# Patient Record
Sex: FEMALE | Race: BLACK OR AFRICAN AMERICAN | Employment: FULL TIME | URBAN - METROPOLITAN AREA
[De-identification: names, ages, dates, MRNs, and addresses within clinical notes are randomized per-mention and may not be internally consistent; named-entity substitution may affect disease eponyms.]

---

## 2020-11-28 ENCOUNTER — HOSPITAL ENCOUNTER (EMERGENCY)
Age: 20
Discharge: HOME | End: 2020-11-29
Attending: EMERGENCY MEDICINE
Payer: MEDICAID

## 2020-11-28 ENCOUNTER — APPOINTMENT (OUTPATIENT)
Dept: CT IMAGING | Age: 20
End: 2020-11-28
Payer: MEDICAID

## 2020-11-28 VITALS
TEMPERATURE: 97.4 F | RESPIRATION RATE: 18 BRPM | SYSTOLIC BLOOD PRESSURE: 148 MMHG | OXYGEN SATURATION: 98 % | WEIGHT: 250 LBS | BODY MASS INDEX: 37.03 KG/M2 | HEART RATE: 101 BPM | HEIGHT: 69 IN | DIASTOLIC BLOOD PRESSURE: 99 MMHG

## 2020-11-28 LAB
ALBUMIN SERPL-MCNC: 3.8 G/DL (ref 3.5–5.2)
ALP BLD-CCNC: 79 U/L (ref 35–104)
ALT SERPL-CCNC: 9 U/L (ref 0–32)
ANION GAP SERPL CALCULATED.3IONS-SCNC: 9 MMOL/L (ref 7–16)
AST SERPL-CCNC: 12 U/L (ref 0–31)
BACTERIA: ABNORMAL /HPF
BILIRUB SERPL-MCNC: 0.4 MG/DL (ref 0–1.2)
BILIRUBIN URINE: NEGATIVE
BLOOD, URINE: NEGATIVE
BUN BLDV-MCNC: 6 MG/DL (ref 6–20)
CALCIUM SERPL-MCNC: 9.1 MG/DL (ref 8.6–10.2)
CHLORIDE BLD-SCNC: 107 MMOL/L (ref 98–107)
CLARITY: CLEAR
CO2: 27 MMOL/L (ref 22–29)
COLOR: YELLOW
CREAT SERPL-MCNC: 0.7 MG/DL (ref 0.5–1)
GFR AFRICAN AMERICAN: >60
GFR NON-AFRICAN AMERICAN: >60 ML/MIN/1.73
GLUCOSE BLD-MCNC: 93 MG/DL (ref 74–99)
GLUCOSE URINE: NEGATIVE MG/DL
HCG, URINE, POC: NEGATIVE
HCT VFR BLD CALC: 35 % (ref 34–48)
HEMOGLOBIN: 11.3 G/DL (ref 11.5–15.5)
KETONES, URINE: NEGATIVE MG/DL
LACTIC ACID, SEPSIS: 1 MMOL/L (ref 0.5–1.9)
LEUKOCYTE ESTERASE, URINE: NEGATIVE
Lab: NORMAL
MCH RBC QN AUTO: 29.6 PG (ref 26–35)
MCHC RBC AUTO-ENTMCNC: 32.3 % (ref 32–34.5)
MCV RBC AUTO: 91.6 FL (ref 80–99.9)
NEGATIVE QC PASS/FAIL: NORMAL
NITRITE, URINE: NEGATIVE
PDW BLD-RTO: 14.1 FL (ref 11.5–15)
PH UA: 6 (ref 5–9)
PLATELET # BLD: 290 E9/L (ref 130–450)
PMV BLD AUTO: 10.1 FL (ref 7–12)
POSITIVE QC PASS/FAIL: NORMAL
POTASSIUM SERPL-SCNC: 3.9 MMOL/L (ref 3.5–5)
PROTEIN UA: NEGATIVE MG/DL
RBC # BLD: 3.82 E12/L (ref 3.5–5.5)
RBC UA: ABNORMAL /HPF (ref 0–2)
SODIUM BLD-SCNC: 143 MMOL/L (ref 132–146)
SPECIFIC GRAVITY UA: 1.02 (ref 1–1.03)
TOTAL PROTEIN: 7.4 G/DL (ref 6.4–8.3)
UROBILINOGEN, URINE: 0.2 E.U./DL
WBC # BLD: 12.5 E9/L (ref 4.5–11.5)
WBC UA: ABNORMAL /HPF (ref 0–5)

## 2020-11-28 PROCEDURE — 6360000002 HC RX W HCPCS: Performed by: NURSE PRACTITIONER

## 2020-11-28 PROCEDURE — 80053 COMPREHEN METABOLIC PANEL: CPT

## 2020-11-28 PROCEDURE — 2580000003 HC RX 258: Performed by: RADIOLOGY

## 2020-11-28 PROCEDURE — 2580000003 HC RX 258: Performed by: EMERGENCY MEDICINE

## 2020-11-28 PROCEDURE — 96375 TX/PRO/DX INJ NEW DRUG ADDON: CPT

## 2020-11-28 PROCEDURE — 74177 CT ABD & PELVIS W/CONTRAST: CPT

## 2020-11-28 PROCEDURE — 36415 COLL VENOUS BLD VENIPUNCTURE: CPT

## 2020-11-28 PROCEDURE — 83605 ASSAY OF LACTIC ACID: CPT

## 2020-11-28 PROCEDURE — 6360000004 HC RX CONTRAST MEDICATION: Performed by: RADIOLOGY

## 2020-11-28 PROCEDURE — 11770 EXC PILONIDAL CYST SIMPLE: CPT

## 2020-11-28 PROCEDURE — 10080 I&D PILONIDAL CYST SIMPLE: CPT

## 2020-11-28 PROCEDURE — 96374 THER/PROPH/DIAG INJ IV PUSH: CPT

## 2020-11-28 PROCEDURE — 85027 COMPLETE CBC AUTOMATED: CPT

## 2020-11-28 PROCEDURE — 81001 URINALYSIS AUTO W/SCOPE: CPT

## 2020-11-28 PROCEDURE — 99282 EMERGENCY DEPT VISIT SF MDM: CPT

## 2020-11-28 RX ORDER — 0.9 % SODIUM CHLORIDE 0.9 %
1000 INTRAVENOUS SOLUTION INTRAVENOUS ONCE
Status: COMPLETED | OUTPATIENT
Start: 2020-11-28 | End: 2020-11-28

## 2020-11-28 RX ORDER — KETOROLAC TROMETHAMINE 30 MG/ML
15 INJECTION, SOLUTION INTRAMUSCULAR; INTRAVENOUS ONCE
Status: COMPLETED | OUTPATIENT
Start: 2020-11-28 | End: 2020-11-28

## 2020-11-28 RX ORDER — SODIUM CHLORIDE 0.9 % (FLUSH) 0.9 %
10 SYRINGE (ML) INJECTION PRN
Status: DISCONTINUED | OUTPATIENT
Start: 2020-11-28 | End: 2020-11-29 | Stop reason: HOSPADM

## 2020-11-28 RX ORDER — FENTANYL CITRATE 50 UG/ML
50 INJECTION, SOLUTION INTRAMUSCULAR; INTRAVENOUS ONCE
Status: COMPLETED | OUTPATIENT
Start: 2020-11-28 | End: 2020-11-28

## 2020-11-28 RX ORDER — LIDOCAINE HYDROCHLORIDE 10 MG/ML
5 INJECTION, SOLUTION INFILTRATION; PERINEURAL ONCE
Status: COMPLETED | OUTPATIENT
Start: 2020-11-28 | End: 2020-11-29

## 2020-11-28 RX ADMIN — KETOROLAC TROMETHAMINE 15 MG: 30 INJECTION, SOLUTION INTRAMUSCULAR at 18:04

## 2020-11-28 RX ADMIN — Medication 10 ML: at 20:04

## 2020-11-28 RX ADMIN — FENTANYL CITRATE 50 MCG: 50 INJECTION, SOLUTION INTRAMUSCULAR; INTRAVENOUS at 22:22

## 2020-11-28 RX ADMIN — IOPAMIDOL 90 ML: 755 INJECTION, SOLUTION INTRAVENOUS at 20:04

## 2020-11-28 RX ADMIN — SODIUM CHLORIDE 1000 ML: 9 INJECTION, SOLUTION INTRAVENOUS at 20:41

## 2020-11-28 ASSESSMENT — PAIN SCALES - GENERAL
PAINLEVEL_OUTOF10: 10
PAINLEVEL_OUTOF10: 10

## 2020-11-29 PROCEDURE — 2500000003 HC RX 250 WO HCPCS: Performed by: STUDENT IN AN ORGANIZED HEALTH CARE EDUCATION/TRAINING PROGRAM

## 2020-11-29 RX ORDER — HYDROCODONE BITARTRATE AND ACETAMINOPHEN 5; 325 MG/1; MG/1
1 TABLET ORAL EVERY 8 HOURS PRN
Qty: 9 TABLET | Refills: 0 | Status: SHIPPED | OUTPATIENT
Start: 2020-11-29 | End: 2020-12-02

## 2020-11-29 RX ORDER — CEPHALEXIN 500 MG/1
500 CAPSULE ORAL 4 TIMES DAILY
Qty: 28 CAPSULE | Refills: 0 | Status: SHIPPED | OUTPATIENT
Start: 2020-11-29 | End: 2020-12-06

## 2020-11-29 RX ORDER — SULFAMETHOXAZOLE AND TRIMETHOPRIM 800; 160 MG/1; MG/1
1 TABLET ORAL 2 TIMES DAILY
Qty: 20 TABLET | Refills: 0 | Status: SHIPPED | OUTPATIENT
Start: 2020-11-29 | End: 2020-12-09

## 2020-11-29 RX ADMIN — LIDOCAINE HYDROCHLORIDE 5 ML: 10 INJECTION, SOLUTION INFILTRATION; PERINEURAL at 00:55

## 2020-11-29 ASSESSMENT — PAIN SCALES - GENERAL: PAINLEVEL_OUTOF10: 10

## 2020-11-29 NOTE — CONSULTS
GENERAL SURGERY  CONSULT NOTE  11/29/2020    Physician Consulted: Dr. Lizet Smith  Reason for Consult: Renal cyst  Referring Physician: Dr. Bharat Lopez is a 23 y.o. female who presents for evaluation of pilonidal cyst.  She says she has had this cyst development for 4 days with increased pain during that time. She states that she has had this happen twice before. The first time home remedies were able to get rid of the cyst the second time it popped and drained on its own. This time however neither 1 of those have happened and the pain is increased. The area is indurated and well demarcated however, there is no area of flocculation that was felt. She has a slightly increased WBC at 12.5, but otherwise denies any fever, chills, nausea or vomiting     History reviewed. No pertinent past medical history. History reviewed. No pertinent surgical history. Medications Prior to Admission:    Prior to Admission medications    Not on File       No Known Allergies    History reviewed. No pertinent family history. Social History     Tobacco Use    Smoking status: Not on file   Substance Use Topics    Alcohol use: Not on file    Drug use: Not on file         Review of Systems   Was reviewed all relevant information please see HPI      PHYSICAL EXAM:    Vitals:    11/28/20 1650   BP: (!) 148/99   Pulse: 101   Resp: 18   Temp: 97.4 °F (36.3 °C)   SpO2: 98%       General Appearance:  awake, alert, oriented, in no acute distress  Head/face:  NCAT  Lungs:  No chest wall tenderness. Heart:  Heart regular rate and rhythm  Abdomen:  Soft, non-tender, normal bowel sounds. No bruits, organomegaly or masses.   Female Rectal: Large inflamed pilonidal cyst    LABS:    CBC  Recent Labs     11/28/20  1719   WBC 12.5*   HGB 11.3*   HCT 35.0        BMP  Recent Labs     11/28/20  1719      K 3.9      CO2 27   BUN 6   CREATININE 0.7   CALCIUM 9.1     Liver Function  Recent Labs 11/28/20  1719   BILITOT 0.4   AST 12   ALT 9   ALKPHOS 79   PROT 7.4   LABALBU 3.8     No results for input(s): LACTATE in the last 72 hours. No results for input(s): INR, PTT in the last 72 hours. Invalid input(s): PT    RADIOLOGY    Ct Abdomen Pelvis W Iv Contrast Additional Contrast? None    Result Date: 11/28/2020  EXAMINATION: CT OF THE ABDOMEN AND PELVIS WITH CONTRAST 11/28/2020 8:01 pm TECHNIQUE: CT of the abdomen and pelvis was performed with the administration of intravenous contrast. Multiplanar reformatted images are provided for review. Dose modulation, iterative reconstruction, and/or weight based adjustment of the mA/kV was utilized to reduce the radiation dose to as low as reasonably achievable. COMPARISON: None. HISTORY: ORDERING SYSTEM PROVIDED HISTORY: pilonidal cyst TECHNOLOGIST PROVIDED HISTORY: Additional Contrast?->None Reason for exam:->pilonidal cyst What reading provider will be dictating this exam?->CRC FINDINGS: Lower Chest: The lung bases are well aerated. Pleural surfaces are unremarkable and no evidence of pleural effusion is identified. Organs: The liver, gallbladder, spleen, pancreas, adrenal glands, kidneys, are unremarkable in appearance. GI/Bowel: The stomach is unremarkable without wall thickening or distention. Bowel loops are unremarkable in appearance without evidence of obstruction, distension or mucosal thickening. Pelvis: The urinary bladder is well distended and unremarkable in appearance. No evidence of pelvic free fluid is seen. Bilateral inguinal prominent lymph nodes are identified measuring up to 18 mm in short axis diameter on the left. Peritoneum/Retroperitoneum: No evidence of retroperitoneal or intraperitoneal lymphadenopathy is identified. No evidence of intraperitoneal free fluid is seen.  Bones/Soft Tissues: Posterior sacral ovoid mildly peripherally enhancing cystic lesion is seen within the subcutaneous tissues posterior to the distal sacrum and coccyx

## 2020-11-29 NOTE — ED PROVIDER NOTES
ED Attending  CC: Elizabeth     [address]  Department of Emergency Medicine   ED  Encounter Note  Admit Date/RoomTime: 2020  4:52 PM  ED Room:     NAME: Natasha Medrano  : 2000  MRN: [id number]    Chief Complaint: Cyst (cyst on tailbone complaining of  10/10 pain)       History of Present Illness        Valentine Nicholas is a 23 y.o. old female who presents to the emergency department by private vehicle, for tender area on Buttocks, which occured 3 day(s) prior to arrival.  Since onset the symptoms have been stable, associated with pain and mild-moderate in severity. She has a history of Pilonidal cyst which spontaneously ruptured previously. ROS   Pertinent positives and negatives are stated within HPI, all other systems reviewed and are negative. Past Medical History:  has no past medical history on file. Surgical History:  has no past surgical history on file. Social History:      Family History: family history is not on file. Allergies: Patient has no known allergies. Physical Exam           ED Triage Vitals   BP Temp Temp src Pulse Resp SpO2 Height Weight   20 1650 20 1644 -- 20 1644 20 1650 20 1644 20 1650 20 1650   (!) 148/99 97.1 °F (36.2 °C)  103 18 98 % 5' 9\" (1.753 m) 250 lb (113.4 kg)      Oxygen Saturation Interpretation: Normal.    Constitutional:  Alert, development consistent with age. HEENT:  NC/NT. Airway patent  Neck:  Normal ROM. Supple. Respiratory:  Clear to auscultation and breath sounds equal.  CV:  Regular rate and rhythm, normal heart sounds, without pathological murmurs, ectopy, gallops, or rubs. GI:  Abdomen Soft, nontender, good bowel sounds. No firm or pulsatile mass. Back:  No costovertebral tenderness. Extremities: No tenderness or edema noted. Integument: Indurated fluctuant erythematous abscess to superior medial gluteal cleft. Normal turgor.   Warm, dry, without visible rash, unless noted elsewhere. Lymphatics: No lymphangitis or adenopathy noted. Neurological:  Oriented. Motor functions intact.      Lab / Imaging Results   (All laboratory and radiology results have been personally reviewed by myself)  Labs:  Results for orders placed or performed during the hospital encounter of 11/28/20   CBC   Result Value Ref Range    WBC 12.5 (H) 4.5 - 11.5 E9/L    RBC 3.82 3.50 - 5.50 E12/L    Hemoglobin 11.3 (L) 11.5 - 15.5 g/dL    Hematocrit 35.0 34.0 - 48.0 %    MCV 91.6 80.0 - 99.9 fL    MCH 29.6 26.0 - 35.0 pg    MCHC 32.3 32.0 - 34.5 %    RDW 14.1 11.5 - 15.0 fL    Platelets 889 997 - 428 E9/L    MPV 10.1 7.0 - 12.0 fL   Comprehensive Metabolic Panel   Result Value Ref Range    Sodium 143 132 - 146 mmol/L    Potassium 3.9 3.5 - 5.0 mmol/L    Chloride 107 98 - 107 mmol/L    CO2 27 22 - 29 mmol/L    Anion Gap 9 7 - 16 mmol/L    Glucose 93 74 - 99 mg/dL    BUN 6 6 - 20 mg/dL    CREATININE 0.7 0.5 - 1.0 mg/dL    GFR Non-African American >60 >=60 mL/min/1.73    GFR African American >60     Calcium 9.1 8.6 - 10.2 mg/dL    Total Protein 7.4 6.4 - 8.3 g/dL    Alb 3.8 3.5 - 5.2 g/dL    Total Bilirubin 0.4 0.0 - 1.2 mg/dL    Alkaline Phosphatase 79 35 - 104 U/L    ALT 9 0 - 32 U/L    AST 12 0 - 31 U/L   Urinalysis with Microscopic   Result Value Ref Range    Color, UA Yellow Straw/Yellow    Clarity, UA Clear Clear    Glucose, Ur Negative Negative mg/dL    Bilirubin Urine Negative Negative    Ketones, Urine Negative Negative mg/dL    Specific Gravity, UA 1.025 1.005 - 1.030    Blood, Urine Negative Negative    pH, UA 6.0 5.0 - 9.0    Protein, UA Negative Negative mg/dL    Urobilinogen, Urine 0.2 <2.0 E.U./dL    Nitrite, Urine Negative Negative    Leukocyte Esterase, Urine Negative Negative    WBC, UA 0-1 0 - 5 /HPF    RBC, UA 0-1 0 - 2 /HPF    Bacteria, UA FEW (A) None Seen /HPF   Lactate, Sepsis   Result Value Ref Range    Lactic Acid, Sepsis 1.0 0.5 - 1.9 mmol/L   POC Pregnancy Urine Qual with the patient in addition to providing specific details for the plan of care and counseling regarding the diagnosis and prognosis. Questions are answered at this time and are agreeable with the plan. Assessment      1. Pilonidal cyst with abscess      Plan   Discharge to home  Patient condition is good    New Medications     New Prescriptions    No medications on file     Electronically signed by JESSI Ramirez CNP   DD: 11/28/20  **This report was transcribed using voice recognition software. Every effort was made to ensure accuracy; however, inadvertent computerized transcription errors may be present.   END OF ED PROVIDER NOTE        JESSI Gallardo Mt, CNP  11/28/20 3544

## 2020-11-29 NOTE — PROCEDURES
Radha Charles is a 23 y.o. female patient. 1. Pilonidal cyst with abscess      History reviewed. No pertinent past medical history. Blood pressure (!) 148/99, pulse 101, temperature 97.4 °F (36.3 °C), resp. rate 18, height 5' 9\" (1.753 m), weight 250 lb (113.4 kg), last menstrual period 11/24/2020, SpO2 98 %. Procedures  Incision and Drainage Procedure Note    Indication: Abscess, pilonidal cyst    Procedure: The patient was positioned appropriately and the skin over the incision site was prepped with betadine and draped in a sterile fashion. Local anesthesia was obtained by infiltration using 1% Lidocaine without epinephrine. An incision was then made over the center of the lesion (a 3cm by 1.5 cm cruciate incision) and approximately 20 cc of thick, mucoid, foul smelling, purulent and bloody material was expressed. Loculations were broken up using a hemostat and more of the material was able to be expressed. The wound travels about 3cm in the distal direction. The drainage cavity was then irrigated, packed with sterile gauze and dressed with a sterile dressing. The patients tetanus status was updated with a tetanus booster. The patient tolerated the procedure well.     Complications: None  PT needs to follow up with Dr. Kathryn Schuster  And continue abx outpatient       Mame Villatoro DO  11/29/2020

## 2020-12-29 ENCOUNTER — OFFICE VISIT (OUTPATIENT)
Dept: SURGERY | Age: 20
End: 2020-12-29
Payer: MEDICAID

## 2020-12-29 VITALS
WEIGHT: 276 LBS | SYSTOLIC BLOOD PRESSURE: 137 MMHG | OXYGEN SATURATION: 100 % | TEMPERATURE: 96.2 F | DIASTOLIC BLOOD PRESSURE: 96 MMHG | HEIGHT: 68 IN | HEART RATE: 78 BPM | RESPIRATION RATE: 14 BRPM | BODY MASS INDEX: 41.83 KG/M2

## 2020-12-29 PROBLEM — L05.01 PILONIDAL CYST WITH ABSCESS: Status: ACTIVE | Noted: 2020-12-29

## 2020-12-29 PROCEDURE — G8427 DOCREV CUR MEDS BY ELIG CLIN: HCPCS | Performed by: SURGERY

## 2020-12-29 PROCEDURE — 99202 OFFICE O/P NEW SF 15 MIN: CPT | Performed by: SURGERY

## 2020-12-29 PROCEDURE — G8484 FLU IMMUNIZE NO ADMIN: HCPCS | Performed by: SURGERY

## 2020-12-29 PROCEDURE — G8417 CALC BMI ABV UP PARAM F/U: HCPCS | Performed by: SURGERY

## 2020-12-29 PROCEDURE — 1036F TOBACCO NON-USER: CPT | Performed by: SURGERY

## 2020-12-29 PROCEDURE — 99212 OFFICE O/P EST SF 10 MIN: CPT | Performed by: SURGERY

## 2020-12-29 NOTE — PROGRESS NOTES
[address]  PROGRESS NOTE    Chief Complaint   Patient presents with    Cyst     ER F/U, I&D pilonidal cyst with abscess 11/29/2020. Patient states not having anytrouble with area but she can feel as if something is still in that area.  Drainage     Pt denies. Subjective:  Feeling well. No drainage or pain. Still has some mild degree of swelling and fullness. Never had this problem before. Finished her antibiotics. Objective:   BP (!) 137/96 (Site: Left Upper Arm, Position: Sitting, Cuff Size: Medium Adult)   Pulse 78   Temp 96.2 °F (35.7 °C) (Infrared)   Resp 14   Ht 5' 8\" (1.727 m)   Wt 276 lb (125.2 kg)   SpO2 100%   BMI 41.97 kg/m²   Buttocks1 cm nontender healing scar in superior gluteal cleft. No erythema, fluctuance, other punctum noted    Assessment:  Pilonidal abscessstatus post incision and drainage in ED      Plan:    Monitor for now  Follow-up as needed    NOTE: This report was transcribed using voice recognition software. Every effort was made to ensure accuracy; however, inadvertent computerized transcription errors may be present.

## 2020-12-29 NOTE — PATIENT INSTRUCTIONS
Patient Education        Learning About Pilonidal Disease  What is pilonidal disease? Pilonidal (say \"yi-icj-BF-dul\") disease is a long-term skin infection. The infection develops in a cyst at the top of or next to the crease between the buttocks. The cyst may look like a small dimple, which is called a \"pit\" or \"sinus. \" Hair may grow from the pit, and you may have several pits. What are the symptoms? · You may have no symptoms. · If the cyst is infected, you may:  ? Have redness or swelling in the area. ? Have cloudy fluid or blood draining from the cyst.  ? Find it hard to walk or sit because of pain. ? Have a fever. This is not common. How can you prevent infection? You may be able to prevent infection and symptoms. · Keep the area clean and dry. · Avoid sitting on hard surfaces for long periods of time. How is pilonidal disease treated? If you have a pilonidal cyst that is not causing symptoms, you don't need medical treatment. If a pilonidal cyst is infected:  · You will probably get antibiotics. If your doctor prescribes antibiotics, take them as directed. Do not stop taking them just because you feel better. You need to take the full course of antibiotics. · Soak in a warm tub several times a day. · Ask your doctor if you can take an over-the-counter pain medicine, such as acetaminophen (Tylenol), ibuprofen (Advil, Motrin), or naproxen (Aleve). Read and follow all instructions on the label. · You may need to have the cyst cut open and drained or removed. Follow-up care is a key part of your treatment and safety. Be sure to make and go to all appointments, and call your doctor if you are having problems. It's also a good idea to know your test results and keep a list of the medicines you take. Where can you learn more? Go to [email or web address] and sign in to your [employer] account. Enter C612 in the [employer] box to learn more about \"Learning About Pilonidal Disease. \"     If you do not have an account, please click on the \"Sign Up Now\" link. Current as of: July 2, 2020               Content Version: 12.6  © 1738-7022 [employer]. Care instructions adapted under license by [facility] ([facility]). If you have questions about a medical condition or this instruction, always ask your healthcare professional. [address] any warranty or liability for your use of this information. Any questions or concerns, please contact my medical assistant Rae at [phone].

## 2021-06-08 ENCOUNTER — OFFICE VISIT (OUTPATIENT)
Dept: FAMILY MEDICINE CLINIC | Age: 21
End: 2021-06-08
Payer: MEDICAID

## 2021-06-08 VITALS
RESPIRATION RATE: 18 BRPM | HEART RATE: 90 BPM | DIASTOLIC BLOOD PRESSURE: 88 MMHG | OXYGEN SATURATION: 99 % | WEIGHT: 285 LBS | HEIGHT: 69 IN | BODY MASS INDEX: 42.21 KG/M2 | TEMPERATURE: 97.3 F | SYSTOLIC BLOOD PRESSURE: 130 MMHG

## 2021-06-08 DIAGNOSIS — E66.01 CLASS 3 SEVERE OBESITY DUE TO EXCESS CALORIES WITHOUT SERIOUS COMORBIDITY WITH BODY MASS INDEX (BMI) OF 40.0 TO 44.9 IN ADULT (HCC): ICD-10-CM

## 2021-06-08 DIAGNOSIS — Z76.89 ENCOUNTER TO ESTABLISH CARE: Primary | ICD-10-CM

## 2021-06-08 DIAGNOSIS — D64.9 ANEMIA, UNSPECIFIED TYPE: ICD-10-CM

## 2021-06-08 DIAGNOSIS — F33.1 MODERATE EPISODE OF RECURRENT MAJOR DEPRESSIVE DISORDER (HCC): ICD-10-CM

## 2021-06-08 DIAGNOSIS — R63.5 WEIGHT GAIN: ICD-10-CM

## 2021-06-08 DIAGNOSIS — L40.9 PSORIASIS: ICD-10-CM

## 2021-06-08 DIAGNOSIS — Z00.00 HEALTHCARE MAINTENANCE: ICD-10-CM

## 2021-06-08 DIAGNOSIS — Z13.220 SCREENING FOR HYPERLIPIDEMIA: ICD-10-CM

## 2021-06-08 PROCEDURE — 99204 OFFICE O/P NEW MOD 45 MIN: CPT | Performed by: FAMILY MEDICINE

## 2021-06-08 PROCEDURE — 1036F TOBACCO NON-USER: CPT | Performed by: FAMILY MEDICINE

## 2021-06-08 PROCEDURE — G8427 DOCREV CUR MEDS BY ELIG CLIN: HCPCS | Performed by: FAMILY MEDICINE

## 2021-06-08 PROCEDURE — G8417 CALC BMI ABV UP PARAM F/U: HCPCS | Performed by: FAMILY MEDICINE

## 2021-06-08 RX ORDER — ESCITALOPRAM OXALATE 5 MG/1
5 TABLET ORAL DAILY
Qty: 30 TABLET | Refills: 1 | Status: SHIPPED
Start: 2021-06-08 | End: 2021-07-06 | Stop reason: SDUPTHER

## 2021-06-08 SDOH — ECONOMIC STABILITY: FOOD INSECURITY: WITHIN THE PAST 12 MONTHS, YOU WORRIED THAT YOUR FOOD WOULD RUN OUT BEFORE YOU GOT MONEY TO BUY MORE.: OFTEN TRUE

## 2021-06-08 SDOH — ECONOMIC STABILITY: FOOD INSECURITY: WITHIN THE PAST 12 MONTHS, THE FOOD YOU BOUGHT JUST DIDN'T LAST AND YOU DIDN'T HAVE MONEY TO GET MORE.: OFTEN TRUE

## 2021-06-08 ASSESSMENT — PATIENT HEALTH QUESTIONNAIRE - PHQ9
8. MOVING OR SPEAKING SO SLOWLY THAT OTHER PEOPLE COULD HAVE NOTICED. OR THE OPPOSITE, BEING SO FIGETY OR RESTLESS THAT YOU HAVE BEEN MOVING AROUND A LOT MORE THAN USUAL: 0
SUM OF ALL RESPONSES TO PHQ QUESTIONS 1-9: 18
7. TROUBLE CONCENTRATING ON THINGS, SUCH AS READING THE NEWSPAPER OR WATCHING TELEVISION: 3
4. FEELING TIRED OR HAVING LITTLE ENERGY: 3
6. FEELING BAD ABOUT YOURSELF - OR THAT YOU ARE A FAILURE OR HAVE LET YOURSELF OR YOUR FAMILY DOWN: 1
2. FEELING DOWN, DEPRESSED OR HOPELESS: 3
5. POOR APPETITE OR OVEREATING: 3
SUM OF ALL RESPONSES TO PHQ9 QUESTIONS 1 & 2: 4
3. TROUBLE FALLING OR STAYING ASLEEP: 3
SUM OF ALL RESPONSES TO PHQ QUESTIONS 1-9: 17
SUM OF ALL RESPONSES TO PHQ QUESTIONS 1-9: 18
1. LITTLE INTEREST OR PLEASURE IN DOING THINGS: 1
10. IF YOU CHECKED OFF ANY PROBLEMS, HOW DIFFICULT HAVE THESE PROBLEMS MADE IT FOR YOU TO DO YOUR WORK, TAKE CARE OF THINGS AT HOME, OR GET ALONG WITH OTHER PEOPLE: 2
9. THOUGHTS THAT YOU WOULD BE BETTER OFF DEAD, OR OF HURTING YOURSELF: 1

## 2021-06-08 ASSESSMENT — COLUMBIA-SUICIDE SEVERITY RATING SCALE - C-SSRS
6. HAVE YOU EVER DONE ANYTHING, STARTED TO DO ANYTHING, OR PREPARED TO DO ANYTHING TO END YOUR LIFE?: NO
7. DID THIS OCCUR IN THE LAST THREE MONTHS: NO
1. WITHIN THE PAST MONTH, HAVE YOU WISHED YOU WERE DEAD OR WISHED YOU COULD GO TO SLEEP AND NOT WAKE UP?: YES
2. HAVE YOU ACTUALLY HAD ANY THOUGHTS OF KILLING YOURSELF?: NO

## 2021-06-08 ASSESSMENT — SOCIAL DETERMINANTS OF HEALTH (SDOH): HOW HARD IS IT FOR YOU TO PAY FOR THE VERY BASICS LIKE FOOD, HOUSING, MEDICAL CARE, AND HEATING?: VERY HARD

## 2021-06-11 DIAGNOSIS — D64.9 ANEMIA, UNSPECIFIED TYPE: ICD-10-CM

## 2021-06-11 DIAGNOSIS — Z13.220 SCREENING FOR HYPERLIPIDEMIA: ICD-10-CM

## 2021-06-11 DIAGNOSIS — R63.5 WEIGHT GAIN: ICD-10-CM

## 2021-06-11 DIAGNOSIS — Z00.00 HEALTHCARE MAINTENANCE: ICD-10-CM

## 2021-06-11 LAB
CHOLESTEROL, TOTAL: 156 MG/DL (ref 0–199)
HCT VFR BLD CALC: 37.5 % (ref 34–48)
HDLC SERPL-MCNC: 49 MG/DL
HEMOGLOBIN: 11.9 G/DL (ref 11.5–15.5)
LDL CHOLESTEROL CALCULATED: 100 MG/DL (ref 0–99)
MCH RBC QN AUTO: 28.1 PG (ref 26–35)
MCHC RBC AUTO-ENTMCNC: 31.7 % (ref 32–34.5)
MCV RBC AUTO: 88.7 FL (ref 80–99.9)
PDW BLD-RTO: 15.2 FL (ref 11.5–15)
PLATELET # BLD: 303 E9/L (ref 130–450)
PMV BLD AUTO: 10.8 FL (ref 7–12)
RBC # BLD: 4.23 E12/L (ref 3.5–5.5)
TRIGL SERPL-MCNC: 37 MG/DL (ref 0–149)
TSH SERPL DL<=0.05 MIU/L-ACNC: 1 UIU/ML (ref 0.27–4.2)
VLDLC SERPL CALC-MCNC: 7 MG/DL
WBC # BLD: 8.9 E9/L (ref 4.5–11.5)

## 2021-06-14 LAB
HEPATITIS C ANTIBODY INTERPRETATION: NORMAL
HIV-1 AND HIV-2 ANTIBODIES: NORMAL

## 2021-06-16 LAB
C. TRACHOMATIS DNA ,URINE: NEGATIVE
SOURCE: NORMAL

## 2021-07-06 ENCOUNTER — OFFICE VISIT (OUTPATIENT)
Dept: FAMILY MEDICINE CLINIC | Age: 21
End: 2021-07-06
Payer: MEDICAID

## 2021-07-06 VITALS
RESPIRATION RATE: 16 BRPM | BODY MASS INDEX: 42.65 KG/M2 | SYSTOLIC BLOOD PRESSURE: 128 MMHG | OXYGEN SATURATION: 98 % | HEIGHT: 69 IN | HEART RATE: 86 BPM | WEIGHT: 288 LBS | TEMPERATURE: 97.7 F | DIASTOLIC BLOOD PRESSURE: 88 MMHG

## 2021-07-06 DIAGNOSIS — F33.0 MILD EPISODE OF RECURRENT MAJOR DEPRESSIVE DISORDER (HCC): ICD-10-CM

## 2021-07-06 PROCEDURE — G8417 CALC BMI ABV UP PARAM F/U: HCPCS | Performed by: FAMILY MEDICINE

## 2021-07-06 PROCEDURE — 99213 OFFICE O/P EST LOW 20 MIN: CPT | Performed by: FAMILY MEDICINE

## 2021-07-06 PROCEDURE — G8427 DOCREV CUR MEDS BY ELIG CLIN: HCPCS | Performed by: FAMILY MEDICINE

## 2021-07-06 PROCEDURE — 1036F TOBACCO NON-USER: CPT | Performed by: FAMILY MEDICINE

## 2021-07-06 RX ORDER — ESCITALOPRAM OXALATE 5 MG/1
5 TABLET ORAL DAILY
Qty: 30 TABLET | Refills: 4 | Status: SHIPPED
Start: 2021-07-06 | End: 2022-06-09 | Stop reason: SDUPTHER

## 2021-07-06 NOTE — PROGRESS NOTES
CC: Sara Parsons is a 24 y.o. yo female is here for evaluation evaluation for the following chronic medical concerns: Depression (4 wk med ck)      HPI:    Depression / anxiety / panic disorder:  does have a therapist for almost 1 year; we recently did start lexapro and she feels this has helped; still gets attacks but not as frequently; feels controlled on current dose of 5mg  Overweight - poor eating habits; working on diet    Vitals:   /88   Pulse 86   Temp 97.7 °F (36.5 °C) (Temporal)   Resp 16   Ht 5' 9\" (1.753 m)   Wt 288 lb (130.6 kg)   SpO2 98%   BMI 42.53 kg/m²   Wt Readings from Last 3 Encounters:   07/06/21 288 lb (130.6 kg)   06/08/21 285 lb (129.3 kg)   12/29/20 276 lb (125.2 kg)       PE:  Constitutional - alert, well appearing, and in no distress  Eyes - extraocular eye movements intact, left eye normal, right eye normal, no conjunctivitis noted  Neck - symmetric, no obvious masses noted  Respiratory- clear to auscultation, no wheezes, rales or rhonchi, symmetric air entry; no increased work of breathing  Cardiovascular - normal rate, regular rhythm, normal S1, S2, no murmurs, rubs, clicks or gallops  Extremities - no edema noted  Abdomen - soft, nontender, nondistended  Skin - normal coloration and turgor, no rashes, no suspicious skin lesions noted      A / P:     Diagnosis Orders   1. Mild episode of recurrent major depressive disorder (HCC)  escitalopram (LEXAPRO) 5 MG tablet     Continue lexapro 5mg  Continue with counseling  Continue to work on diet and exercise as able     RTO: Return in about 4 months (around 11/6/2021) for anxiety/ depression f/u.       An electronic signature was used to authenticate this note.  ---- Klaus Varner MD on 7/6/2021 at 12:37 PM

## 2021-09-30 ENCOUNTER — OFFICE VISIT (OUTPATIENT)
Dept: PRIMARY CARE CLINIC | Age: 21
End: 2021-09-30

## 2021-09-30 VITALS
HEART RATE: 103 BPM | DIASTOLIC BLOOD PRESSURE: 82 MMHG | BODY MASS INDEX: 42.65 KG/M2 | TEMPERATURE: 97.7 F | WEIGHT: 288 LBS | OXYGEN SATURATION: 97 % | HEIGHT: 69 IN | SYSTOLIC BLOOD PRESSURE: 126 MMHG

## 2021-09-30 DIAGNOSIS — Z20.822 SUSPECTED COVID-19 VIRUS INFECTION: ICD-10-CM

## 2021-09-30 DIAGNOSIS — U07.1 COVID-19: Primary | ICD-10-CM

## 2021-09-30 LAB
Lab: ABNORMAL
PERFORMING INSTRUMENT: ABNORMAL
QC PASS/FAIL: ABNORMAL
SARS-COV-2, POC: DETECTED

## 2021-09-30 PROCEDURE — 99213 OFFICE O/P EST LOW 20 MIN: CPT | Performed by: NURSE PRACTITIONER

## 2021-09-30 PROCEDURE — 87426 SARSCOV CORONAVIRUS AG IA: CPT | Performed by: NURSE PRACTITIONER

## 2021-09-30 RX ORDER — DEXAMETHASONE 6 MG/1
6 TABLET ORAL 2 TIMES DAILY WITH MEALS
Qty: 20 TABLET | Refills: 0 | Status: SHIPPED | OUTPATIENT
Start: 2021-09-30 | End: 2021-10-10

## 2021-09-30 RX ORDER — AZITHROMYCIN 250 MG/1
250 TABLET, FILM COATED ORAL SEE ADMIN INSTRUCTIONS
Qty: 6 TABLET | Refills: 0 | Status: SHIPPED | OUTPATIENT
Start: 2021-09-30 | End: 2021-10-05

## 2021-09-30 NOTE — LETTER
[address] [facility] [place]  [address] [facility], [address] [place] [address]  Phone: [phone]  Fax: Laquita Taylor 308, APRN - CNP      9/30/2021     Patient: Rishi Hamilton   YOB: 2000       To Whom It May Concern: It is my medical opinion that Rishi Hamilton should remain out of work while acutely ill and positiveCOVID-19 test results. Return to work with no retesting should be followed. If tests positive for COVID-19, needs minimum of 10 days strict quarantine, improvement of symptoms and 24 hours fever free without fever reducing medications. If you have any questions or concerns, please don't hesitate to call.     Sincerely,        Rafaela Lopez, JESSI - CNP

## 2021-09-30 NOTE — PROGRESS NOTES
Chief Complaint   Cough (x1 week ), Migraine, Congestion, Nausea, and Chest Congestion      History of Present Illness   Source of history provided by:  patientFaby Holland is a 24 y.o. old female who has a past medical history of: History reviewed. No pertinent past medical history. Presents to the flu clinic with complaints of Fever, Headache, Nasal Congestion, dry Cough and Chest congestion x 6 days. States symptoms have worsened  since onset. Has been taking ibuprofen without symptomatic relief. Also taking extra vitamin c. Denies any Nausea, Vomiting or Close contact with a lab confirmed COVID-19 patient within 14 days of symptom onset . Denies any hx of no history of pneumonia or bronchitis. ROS   Pertinent positives and negatives are stated within HPI, all other systems reviewed and are negative. Surgical History:  has a past surgical history that includes Tonsillectomy and adenoidectomy and cyst removal.  Social History:  reports that she quit smoking about 9 months ago. She has never used smokeless tobacco. She reports current alcohol use. She reports previous drug use. Family History: family history includes Breast Cancer in her maternal great grandmother; High Blood Pressure in her mother. Allergies: Patient has no known allergies. Physical Exam      VS:  /82   Pulse 103   Temp 97.7 °F (36.5 °C) (Temporal)   Ht 5' 9\" (1.753 m)   Wt 288 lb (130.6 kg)   SpO2 97%   BMI 42.53 kg/m²    Oxygen Saturation Interpretation: Normal.    Constitutional:  Alert, development consistent with age. NAD. Head:  NC/NT. Airway patent. Ears: TMs wnl bilaterally. Canals without exudate or swelling bilaterally. Mouth: Posterior pharynx with mild erythema and clear postnasal drip. no tonsillar hypertrophy or exudate. Neck:  Normal ROM. Supple. no anterior cervical adenopathy noted. Lungs: CTAB without wheezes, rales, or rhonchi.    CV:  Regular rate and rhythm, normal heart sounds, without pathological murmurs, ectopy, gallops, or rubs. Skin:  Normal turgor. Warm, dry, without visible rash. Lymphatic: No lymphangitis or adenopathy noted. Neurological:  Oriented. Motor functions intact. Lab / Imaging Results   (All laboratory and radiology results have been personally reviewed by myself)  Labs:  Results for orders placed or performed in visit on 09/30/21   POCT COVID-19, Antigen   Result Value Ref Range    SARS-COV-2, POC Detected (A) Not Detected    Lot Number [id number]     QC Pass/Fail pass     Performing Instrument BinaxNOW        Imaging: All Radiology results interpreted by Radiologist unless otherwise noted. No results found. Medical Decision Making   Pt non-toxic, in no apparent distress and stable at time of discharge. Assessment/Plan   Valentine was seen today for cough, migraine, congestion, nausea and chest congestion. Diagnoses and all orders for this visit:    COVID-19  -     azithromycin (ZITHROMAX) 250 MG tablet; Take 1 tablet by mouth See Admin Instructions for 5 days 500mg on day 1 followed by 250mg on days 2 - 5  -     dexamethasone (DECADRON) 6 MG tablet; Take 1 tablet by mouth 2 times daily (with meals) for 10 days    Suspected COVID-19 virus infection  -     POCT COVID-19, Antigen         Work excuse provided to patient today. Increase fluids and rest. Symptomatic relief discussed including Tylenol prn pain/fever. Schedule f/u with PCP in 7-10 days if symptoms persist. ED sooner if symptoms worsen or change. ED immediately with high or refractory fever, progressive SOB, dyspnea, CP, calf pain/swelling, shaking chills, vomiting, abdominal pain, lethargy, flank pain, or decreased urinary output. Pt verbalizes understanding and is in agreement with plan of care. All questions answered. Raegan Peraza, APRN - CNP    This visit was provided as a focused evaluation during the COVID -19 pandemic/national emergency.   A comprehensive review of all previous patient history and testing was not conducted. Pertinent findings were elicited during the visit.

## 2022-06-09 ENCOUNTER — OFFICE VISIT (OUTPATIENT)
Dept: FAMILY MEDICINE CLINIC | Age: 22
End: 2022-06-09
Payer: MEDICAID

## 2022-06-09 VITALS
SYSTOLIC BLOOD PRESSURE: 112 MMHG | BODY MASS INDEX: 43.92 KG/M2 | HEIGHT: 68 IN | OXYGEN SATURATION: 98 % | TEMPERATURE: 97.3 F | WEIGHT: 289.8 LBS | HEART RATE: 66 BPM | DIASTOLIC BLOOD PRESSURE: 68 MMHG

## 2022-06-09 DIAGNOSIS — Z00.00 ENCOUNTER FOR PREVENTIVE CARE: Primary | ICD-10-CM

## 2022-06-09 DIAGNOSIS — Z12.4 SCREENING FOR CERVICAL CANCER: ICD-10-CM

## 2022-06-09 DIAGNOSIS — E66.01 CLASS 3 SEVERE OBESITY DUE TO EXCESS CALORIES WITHOUT SERIOUS COMORBIDITY WITH BODY MASS INDEX (BMI) OF 40.0 TO 44.9 IN ADULT (HCC): ICD-10-CM

## 2022-06-09 DIAGNOSIS — Z00.00 HEALTHCARE MAINTENANCE: ICD-10-CM

## 2022-06-09 DIAGNOSIS — F33.1 MODERATE EPISODE OF RECURRENT MAJOR DEPRESSIVE DISORDER (HCC): ICD-10-CM

## 2022-06-09 DIAGNOSIS — E55.9 VITAMIN D DEFICIENCY, UNSPECIFIED: ICD-10-CM

## 2022-06-09 PROBLEM — D64.9 ANEMIA: Status: RESOLVED | Noted: 2021-06-08 | Resolved: 2022-06-09

## 2022-06-09 PROCEDURE — 99395 PREV VISIT EST AGE 18-39: CPT | Performed by: FAMILY MEDICINE

## 2022-06-09 RX ORDER — HYDROXYZINE PAMOATE 25 MG/1
25 CAPSULE ORAL DAILY PRN
Qty: 30 CAPSULE | Refills: 0 | Status: SHIPPED
Start: 2022-06-09 | End: 2022-09-08 | Stop reason: SDUPTHER

## 2022-06-09 RX ORDER — ESCITALOPRAM OXALATE 10 MG/1
10 TABLET ORAL DAILY
Qty: 90 TABLET | Refills: 1 | Status: SHIPPED
Start: 2022-06-09 | End: 2022-09-08 | Stop reason: SDUPTHER

## 2022-06-09 SDOH — ECONOMIC STABILITY: FOOD INSECURITY: WITHIN THE PAST 12 MONTHS, THE FOOD YOU BOUGHT JUST DIDN'T LAST AND YOU DIDN'T HAVE MONEY TO GET MORE.: NEVER TRUE

## 2022-06-09 SDOH — ECONOMIC STABILITY: FOOD INSECURITY: WITHIN THE PAST 12 MONTHS, YOU WORRIED THAT YOUR FOOD WOULD RUN OUT BEFORE YOU GOT MONEY TO BUY MORE.: NEVER TRUE

## 2022-06-09 ASSESSMENT — PATIENT HEALTH QUESTIONNAIRE - PHQ9
6. FEELING BAD ABOUT YOURSELF - OR THAT YOU ARE A FAILURE OR HAVE LET YOURSELF OR YOUR FAMILY DOWN: 3
SUM OF ALL RESPONSES TO PHQ QUESTIONS 1-9: 23
10. IF YOU CHECKED OFF ANY PROBLEMS, HOW DIFFICULT HAVE THESE PROBLEMS MADE IT FOR YOU TO DO YOUR WORK, TAKE CARE OF THINGS AT HOME, OR GET ALONG WITH OTHER PEOPLE: 2
SUM OF ALL RESPONSES TO PHQ QUESTIONS 1-9: 21
5. POOR APPETITE OR OVEREATING: 3
4. FEELING TIRED OR HAVING LITTLE ENERGY: 3
3. TROUBLE FALLING OR STAYING ASLEEP: 3
SUM OF ALL RESPONSES TO PHQ9 QUESTIONS 1 & 2: 6
SUM OF ALL RESPONSES TO PHQ QUESTIONS 1-9: 23
SUM OF ALL RESPONSES TO PHQ QUESTIONS 1-9: 23
1. LITTLE INTEREST OR PLEASURE IN DOING THINGS: 3
2. FEELING DOWN, DEPRESSED OR HOPELESS: 3
8. MOVING OR SPEAKING SO SLOWLY THAT OTHER PEOPLE COULD HAVE NOTICED. OR THE OPPOSITE, BEING SO FIGETY OR RESTLESS THAT YOU HAVE BEEN MOVING AROUND A LOT MORE THAN USUAL: 3
7. TROUBLE CONCENTRATING ON THINGS, SUCH AS READING THE NEWSPAPER OR WATCHING TELEVISION: 0
9. THOUGHTS THAT YOU WOULD BE BETTER OFF DEAD, OR OF HURTING YOURSELF: 2

## 2022-06-09 ASSESSMENT — COLUMBIA-SUICIDE SEVERITY RATING SCALE - C-SSRS
5. HAVE YOU STARTED TO WORK OUT OR WORKED OUT THE DETAILS OF HOW TO KILL YOURSELF? DO YOU INTEND TO CARRY OUT THIS PLAN?: NO
4. HAVE YOU HAD THESE THOUGHTS AND HAD SOME INTENTION OF ACTING ON THEM?: NO
2. HAVE YOU ACTUALLY HAD ANY THOUGHTS OF KILLING YOURSELF?: YES
6. HAVE YOU EVER DONE ANYTHING, STARTED TO DO ANYTHING, OR PREPARED TO DO ANYTHING TO END YOUR LIFE?: NO
3. HAVE YOU BEEN THINKING ABOUT HOW YOU MIGHT KILL YOURSELF?: NO
1. WITHIN THE PAST MONTH, HAVE YOU WISHED YOU WERE DEAD OR WISHED YOU COULD GO TO SLEEP AND NOT WAKE UP?: YES

## 2022-06-09 ASSESSMENT — SOCIAL DETERMINANTS OF HEALTH (SDOH): HOW HARD IS IT FOR YOU TO PAY FOR THE VERY BASICS LIKE FOOD, HOUSING, MEDICAL CARE, AND HEATING?: VERY HARD

## 2022-06-09 NOTE — PROGRESS NOTES
CC: John Mayo is a 20 y.o. yo female here for evaluation of the following medical concerns: Annual Exam (Physical) and Depression (Asking for discussion about possibly changing the dose.)      HPI:      Depression / anxiety / panic disorder:  Did have therapist but stopped going; did well on lexapro but has been out of refills for some time; feels she would benefit from increase in dose; today no SI/HI; thoughts of harm at times, no plans; overwhelmed with finances  PHQ-9 Total Score: 23 (6/9/2022  1:46 PM)  Thoughts that you would be better off dead, or of hurting yourself in some way: 2 (6/9/2022  1:46 PM)  Overweight - poor eating habits; working on diet      Adult Screening:   Blood pressure normotensive: Yes    Obesity (BMI 30+): No   Diabetes screen: NA    Statin for CVD events and mortality preventive medication: see above   Aspirin for CVD and colon cancer preventive medication: see above   Diet to prevent CV disease (adults with cardiovascular risk factors): NA; Patient's cardiovascular risk history includes: none   Alcohol use:   reports current alcohol use.  Tobacco abuse:   reports that she quit smoking about 17 months ago. She has never used smokeless tobacco.  Depression screening: PHQ-9 Total Score: 23 (6/9/2022  1:46 PM)  Thoughts that you would be better off dead, or of hurting yourself in some way: 2 (6/9/2022  1:46 PM)  Annual lung cancer screening: NA    Colorectal Cancer Screening, average risk (50-75yrs): NA    Hx of CRC before age 57yrs in FDR: NA    Hep C virus infection screening: NA    Fall prevention: NA     Sexually active specific:    Sexual activity: single partner, contraception - no; High risk behavior: none,  Sexually transmitted infections counseling: no    Female specific:   Folate supplementation (reproductive age): NA    Intimate partner violence concerns (reproductive age): safe    Cervical Cancer Screening (21 - 65 years): not UTD;  Hx abnormal PAP:    BRCA risk assessment and genetic counseling/testing: FH of breast can in [place]   Breast Cancer Screening: NA    Osteoporosis screening in postmenopausal women younger than 65 years at increased risk of osteoporosis / 10-year FRAX risk of major osteoporotic fracture (MOF) (without DXA) greater than that of a 70-year-old white woman without major risk factors (8.4%): NA    Osteoporosis screening: women 65 years and older: NA        Health Maintenance Due   Topic Date Due    Pap smear  Never done    DTaP/Tdap/Td vaccine (7 - Td or Tdap) 10/03/2021    Depression Monitoring  06/08/2022    Chlamydia screen  06/11/2022     Immunization History   Administered Date(s) Administered    COVID-19, [place] top, DO NOT Dilute, Alejandro-Sucrose, 12+ yrs, PF, 30 mcg/0.3 mL dose 05/31/2022    DTaP (Infanrix) 1999, 1999, 1999, 06/07/2000, 12/02/2003    HIB PRP-T (ActHIB, Hiberix) 1999, 1999, 1999, 06/07/2000    HPV 9-valent Mace ) 11/01/2016    HPV Quadrivalent (Gardasil) 10/03/2011, 09/06/2013    Hepatitis A Ped/Adol (Havrix, Vaqta) 06/14/2007, 06/05/2009    Hepatitis B 1999, 1999, 1999    Influenza A ([id number]) Vaccine PF IM 12/19/2009    Influenza Virus Vaccine 12/02/2003, 10/03/2011, 01/02/2013, 11/01/2016    MMR 03/24/2000, 12/02/2003    Meningococcal MCV4P (Menactra) 10/03/2011, 11/01/2016    Pneumococcal Conjugate 7-valent (Prevnar7) 10/06/2000    Polio IPV (IPOL) 1999, 1999, 03/24/2000, 12/02/2003    Tdap (Boostrix, Adacel) 10/03/2011    Varicella (Varivax) 03/24/2000, 06/14/2007       History reviewed. No pertinent past medical history.   Past Surgical History:   Procedure Laterality Date    CYST REMOVAL      TONSILLECTOMY AND ADENOIDECTOMY       Family History   Problem Relation Age of Onset    High Blood Pressure Mother     Breast Cancer Maternal Great Grandmother      Social History     Tobacco Use    Smoking status: Former Smoker     Quit date: 2020     Years since quittin.4    Smokeless tobacco: Never Used   Vaping Use    Vaping Use: Some days    Substances: CBD, Flavoring    Devices: [employer] [place] tank   Substance Use Topics    Alcohol use: Yes     Comment: social    Drug use: Not Currently     No Known Allergies  Prior to Admission medications    Medication Sig Start Date End Date Taking? Authorizing Provider   escitalopram (LEXAPRO) 10 MG tablet Take 1 tablet by mouth daily 22  Yes Blaine Michelle MD   hydrOXYzine pamoate (VISTARIL) 25 MG capsule Take 1 capsule by mouth daily as needed for Anxiety 22 Yes Blaine Michelle MD   hydrocortisone 2.5 % cream Apply topically 2 times daily. Patient not taking: Reported on 2022   Blaine Michelle MD     Vitals:  /68 (Site: Left Upper Arm, Position: Sitting, Cuff Size: Large Adult)   Pulse 66   Temp 97.3 °F (36.3 °C) (Temporal)   Ht 5' 7.5\" (1.715 m)   Wt 289 lb 12.8 oz (131.5 kg)   LMP 2022 (Exact Date)   SpO2 98%   BMI 44.72 kg/m²   Wt Readings from Last 3 Encounters:   22 289 lb 12.8 oz (131.5 kg)   21 288 lb (130.6 kg)   21 288 lb (130.6 kg)       Physical Exam:  Constitutional - alert, well appearing, and in no distress  Eyes - extraocular eye movements intact, left eye normal, right eye normal, no conjunctivitis noted  Neck - supple, no significant adenopathy; thyroid exam: thyroid is normal in size without nodules or tenderness  Respiratory- clear to auscultation, no wheezes, rales or rhonchi, symmetric air entry; no increased work of breathing  Cardiovascular - normal rate, regular rhythm, normal S1, S2, no murmurs, rubs, clicks or gallops;    Extremities - no edema noted  Abdomen - soft, nontender, nondistended  Musculoskeletal -  no clubbing, cyanosis of extremities noted; no joint deformity or swelling noted  Skin - normal coloration and turgor, no rashes, no suspicious skin lesions noted  Neurological - alert, oriented; no obvious CN deficits, normal speech, no obvious focal findings noted  Psychiatric - normal mood, behavior, speech, dress    Labs:  Pertinent labs, imaging, other diagnostic data and other clinician documentation reviewed in electronic medical record. Assessment / Plan   Diagnosis Orders   1. Encounter for preventive care  CBC with Auto Differential    Comprehensive Metabolic Panel    Lipid Panel    TSH   2. Moderate episode of recurrent major depressive disorder (HCC)  escitalopram (LEXAPRO) 10 MG tablet    hydrOXYzine pamoate (VISTARIL) 25 MG capsule    Heidi Navarro LISW, Counseling Services, [facility]    CBC with Auto Differential    Comprehensive Metabolic Panel    Lipid Panel    TSH   3. Class 3 severe obesity due to excess calories without serious comorbidity with body mass index (BMI) of 40.0 to 44.9 in adult ([facility] [address]     4. Vitamin D deficiency, unspecified  Vitamin D 25 Hydroxy   5. Screening for cervical cancer  Jesenia Atkins MD, OB/GYN, [facility]   6. Healthcare maintenance  CHLAMYDIA TRACHOMATIS DNA, URINE       Resume lexapro 5mg; then increase to 10mg after 1 week  Referral to Heidi for high PHQ-9 and verify she can get re-established with counseling  Close f/u with me as well  Labs as ordered  Work on diet and exercise as able; work on depression / anxiety symptoms first    RTO: Return in about 4 months (around 10/9/2022) for chronic disease / routine f/u.       An electronic signature was used to authenticate this note.  ---- Malinda Lino MD on 6/9/2022 at 2:38 PM

## 2022-06-10 ENCOUNTER — TELEPHONE (OUTPATIENT)
Dept: FAMILY MEDICINE CLINIC | Age: 22
End: 2022-06-10

## 2022-06-10 NOTE — TELEPHONE ENCOUNTER
[facility] received referral in [facility] for a diagnosis of depression. Call placed to pt today and message left requesting return call at her convenience.  Santosh Phillips, MSW, LISW-S, OSW-C

## 2022-06-16 ENCOUNTER — TELEPHONE (OUTPATIENT)
Dept: FAMILY MEDICINE CLINIC | Age: 22
End: 2022-06-16

## 2022-06-16 NOTE — TELEPHONE ENCOUNTER
[facility] for a diagnosis of depression. 2nd call placed to pt today and message left requesting return call at her convenience.  Brian Aguilar MSW, LISW-S, OSW-C

## 2022-06-29 ENCOUNTER — TELEPHONE (OUTPATIENT)
Dept: FAMILY MEDICINE CLINIC | Age: 22
End: 2022-06-29

## 2022-06-29 NOTE — TELEPHONE ENCOUNTER
[facility] a [facility]. 3rd call placed to pt today and voicemail now full. Will reattempt.  OLIVER Gutierres, LISW-S, OSW-C

## 2022-07-06 ENCOUNTER — TELEPHONE (OUTPATIENT)
Dept: FAMILY MEDICINE CLINIC | Age: 22
End: 2022-07-06

## 2022-07-06 NOTE — TELEPHONE ENCOUNTER
[facility] for a diagnosis of depression. 4th call placed to pt today and voicemail remains full. Unable to reach pt at this time.  Jl Quezada MSW, LISW-S, OSW-C

## 2022-09-08 ENCOUNTER — OFFICE VISIT (OUTPATIENT)
Dept: FAMILY MEDICINE CLINIC | Age: 22
End: 2022-09-08
Payer: MEDICAID

## 2022-09-08 VITALS
BODY MASS INDEX: 44.41 KG/M2 | HEART RATE: 88 BPM | HEIGHT: 68 IN | SYSTOLIC BLOOD PRESSURE: 122 MMHG | DIASTOLIC BLOOD PRESSURE: 84 MMHG | WEIGHT: 293 LBS | TEMPERATURE: 97.3 F | OXYGEN SATURATION: 99 %

## 2022-09-08 DIAGNOSIS — L05.91 CYST NEAR TAILBONE: ICD-10-CM

## 2022-09-08 DIAGNOSIS — E55.9 VITAMIN D DEFICIENCY, UNSPECIFIED: Primary | ICD-10-CM

## 2022-09-08 DIAGNOSIS — E55.9 VITAMIN D DEFICIENCY, UNSPECIFIED: ICD-10-CM

## 2022-09-08 DIAGNOSIS — E66.01 CLASS 3 SEVERE OBESITY DUE TO EXCESS CALORIES WITHOUT SERIOUS COMORBIDITY WITH BODY MASS INDEX (BMI) OF 45.0 TO 49.9 IN ADULT (HCC): ICD-10-CM

## 2022-09-08 DIAGNOSIS — Z00.00 ENCOUNTER FOR PREVENTIVE CARE: ICD-10-CM

## 2022-09-08 DIAGNOSIS — N91.2 AMENORRHEA: ICD-10-CM

## 2022-09-08 DIAGNOSIS — F33.1 MODERATE EPISODE OF RECURRENT MAJOR DEPRESSIVE DISORDER (HCC): Primary | ICD-10-CM

## 2022-09-08 DIAGNOSIS — F33.1 MODERATE EPISODE OF RECURRENT MAJOR DEPRESSIVE DISORDER (HCC): ICD-10-CM

## 2022-09-08 PROBLEM — F33.0 MILD EPISODE OF RECURRENT MAJOR DEPRESSIVE DISORDER (HCC): Status: RESOLVED | Noted: 2021-06-08 | Resolved: 2022-09-08

## 2022-09-08 LAB
ALBUMIN SERPL-MCNC: 4 G/DL (ref 3.5–5.2)
ALP BLD-CCNC: 117 U/L (ref 35–104)
ALT SERPL-CCNC: 12 U/L (ref 0–32)
ANION GAP SERPL CALCULATED.3IONS-SCNC: 10 MMOL/L (ref 7–16)
AST SERPL-CCNC: 13 U/L (ref 0–31)
BASOPHILS ABSOLUTE: 0.04 E9/L (ref 0–0.2)
BASOPHILS RELATIVE PERCENT: 0.3 % (ref 0–2)
BILIRUB SERPL-MCNC: <0.2 MG/DL (ref 0–1.2)
BUN BLDV-MCNC: 7 MG/DL (ref 6–20)
CALCIUM SERPL-MCNC: 9.3 MG/DL (ref 8.6–10.2)
CHLORIDE BLD-SCNC: 106 MMOL/L (ref 98–107)
CHOLESTEROL, TOTAL: 179 MG/DL (ref 0–199)
CO2: 25 MMOL/L (ref 22–29)
CONTROL: NORMAL
CREAT SERPL-MCNC: 0.9 MG/DL (ref 0.5–1)
EOSINOPHILS ABSOLUTE: 0.12 E9/L (ref 0.05–0.5)
EOSINOPHILS RELATIVE PERCENT: 1 % (ref 0–6)
GFR AFRICAN AMERICAN: >60
GFR NON-AFRICAN AMERICAN: >60 ML/MIN/1.73
GLUCOSE BLD-MCNC: 85 MG/DL (ref 74–99)
HCT VFR BLD CALC: 34.3 % (ref 34–48)
HDLC SERPL-MCNC: 46 MG/DL
HEMOGLOBIN: 10.8 G/DL (ref 11.5–15.5)
IMMATURE GRANULOCYTES #: 0.04 E9/L
IMMATURE GRANULOCYTES %: 0.3 % (ref 0–5)
LDL CHOLESTEROL CALCULATED: 119 MG/DL (ref 0–99)
LYMPHOCYTES ABSOLUTE: 3.51 E9/L (ref 1.5–4)
LYMPHOCYTES RELATIVE PERCENT: 30.4 % (ref 20–42)
MCH RBC QN AUTO: 26.8 PG (ref 26–35)
MCHC RBC AUTO-ENTMCNC: 31.5 % (ref 32–34.5)
MCV RBC AUTO: 85.1 FL (ref 80–99.9)
MONOCYTES ABSOLUTE: 0.98 E9/L (ref 0.1–0.95)
MONOCYTES RELATIVE PERCENT: 8.5 % (ref 2–12)
NEUTROPHILS ABSOLUTE: 6.87 E9/L (ref 1.8–7.3)
NEUTROPHILS RELATIVE PERCENT: 59.5 % (ref 43–80)
PDW BLD-RTO: 16.4 FL (ref 11.5–15)
PLATELET # BLD: 386 E9/L (ref 130–450)
PMV BLD AUTO: 10.1 FL (ref 7–12)
POTASSIUM SERPL-SCNC: 3.8 MMOL/L (ref 3.5–5)
PREGNANCY TEST URINE, POC: NEGATIVE
RBC # BLD: 4.03 E12/L (ref 3.5–5.5)
SODIUM BLD-SCNC: 141 MMOL/L (ref 132–146)
TOTAL PROTEIN: 7.8 G/DL (ref 6.4–8.3)
TRIGL SERPL-MCNC: 70 MG/DL (ref 0–149)
TSH SERPL DL<=0.05 MIU/L-ACNC: 1.48 UIU/ML (ref 0.27–4.2)
VITAMIN D 25-HYDROXY: 15 NG/ML (ref 30–100)
VLDLC SERPL CALC-MCNC: 14 MG/DL
WBC # BLD: 11.6 E9/L (ref 4.5–11.5)

## 2022-09-08 PROCEDURE — 99214 OFFICE O/P EST MOD 30 MIN: CPT | Performed by: FAMILY MEDICINE

## 2022-09-08 PROCEDURE — G8417 CALC BMI ABV UP PARAM F/U: HCPCS | Performed by: FAMILY MEDICINE

## 2022-09-08 PROCEDURE — G8427 DOCREV CUR MEDS BY ELIG CLIN: HCPCS | Performed by: FAMILY MEDICINE

## 2022-09-08 PROCEDURE — 81025 URINE PREGNANCY TEST: CPT | Performed by: FAMILY MEDICINE

## 2022-09-08 PROCEDURE — 1036F TOBACCO NON-USER: CPT | Performed by: FAMILY MEDICINE

## 2022-09-08 RX ORDER — ERGOCALCIFEROL 1.25 MG/1
50000 CAPSULE ORAL WEEKLY
Qty: 12 CAPSULE | Refills: 3 | Status: SHIPPED | OUTPATIENT
Start: 2022-09-08

## 2022-09-08 RX ORDER — ESCITALOPRAM OXALATE 10 MG/1
10 TABLET ORAL DAILY
Qty: 90 TABLET | Refills: 1 | Status: SHIPPED | OUTPATIENT
Start: 2022-09-08

## 2022-09-08 RX ORDER — HYDROXYZINE PAMOATE 25 MG/1
25 CAPSULE ORAL DAILY PRN
Qty: 90 CAPSULE | Refills: 1 | Status: SHIPPED | OUTPATIENT
Start: 2022-09-08 | End: 2023-03-07

## 2022-09-08 NOTE — PROGRESS NOTES
CC: Riley Murillo is a 20 y.o. yo female is here for evaluation evaluation for the following acute & chronic medical concerns: Amenorrhea (Missed her last period but unsure exactly when her last one was; asking for pregnancy test) and Cyst (Recurring cyst on back; asking if can have a prophylactic antibiotic for it.)      HPI:    Missed period; concern for pregnancy    Recurring cyst on back near tailbone; popped on own 1 week ago and keeps coming back  Depression / anxiety / panic disorder:  back on lexapro 10mg and doing well; did resume couseling but thinking about switching to new counselor  Overweight - poor eating habits; working on diet    Vitals:   /84 (Site: Left Lower Arm, Position: Sitting, Cuff Size: Medium Adult)   Pulse 88   Temp 97.3 °F (36.3 °C) (Temporal)   Ht 5' 7.5\" (1.715 m)   Wt 299 lb 9.6 oz (135.9 kg)   LMP 08/08/2022 (Approximate)   SpO2 99%   BMI 46.23 kg/m²   Wt Readings from Last 3 Encounters:   09/08/22 299 lb 9.6 oz (135.9 kg)   06/09/22 289 lb 12.8 oz (131.5 kg)   09/30/21 288 lb (130.6 kg)       PE:  Constitutional - alert, well appearing, and in no distress  Eyes - extraocular eye movements intact, left eye normal, right eye normal, no conjunctivitis noted  Neck - symmetric, no obvious masses noted  Respiratory- clear to auscultation, no wheezes, rales or rhonchi, symmetric air entry; no increased work of breathing  Cardiovascular - normal rate, regular rhythm, normal S1, S2, no murmurs, rubs, clicks or gallops  Extremities - no edema noted  Abdomen - soft, nontender, nondistended  Skin - normal coloration and turgor, no rashes, no suspicious skin lesions noted  Neurological - no obvious CN deficits or focal neurological deficits  Psychiatric - alert, oriented; normal mood, behavior, speech, dress    A / P:     Diagnosis Orders   1.  Moderate episode of recurrent major depressive disorder (HCC)  escitalopram (LEXAPRO) 10 MG tablet    hydrOXYzine pamoate (VISTARIL) 25 MG capsule      2. Amenorrhea  POCT urine pregnancy      3. Cyst near Bj 2, MD, General Surgery, [place]      4. Class 3 severe obesity due to excess calories without serious comorbidity with body mass index (BMI) of 45.0 to 49.9 in adult [facility])            Continue lexapro 10mg  Continue counseling  Urine preg neg;  provided  Work on diet and exercise as able  Referral to gen surg for recurring tailbone cyst      RTO: Return in about 4 months (around 1/8/2023) for chronic disease / routine f/u.         An electronic signature was used to authenticate this note.  ---- Luanne Keyes MD on 9/8/2022 at 1:20 PM